# Patient Record
Sex: FEMALE | Race: WHITE | NOT HISPANIC OR LATINO | Employment: UNEMPLOYED | URBAN - METROPOLITAN AREA
[De-identification: names, ages, dates, MRNs, and addresses within clinical notes are randomized per-mention and may not be internally consistent; named-entity substitution may affect disease eponyms.]

---

## 2017-10-08 ENCOUNTER — APPOINTMENT (OUTPATIENT)
Dept: GENERAL RADIOLOGY | Facility: HOSPITAL | Age: 62
End: 2017-10-08

## 2017-10-08 ENCOUNTER — HOSPITAL ENCOUNTER (EMERGENCY)
Facility: HOSPITAL | Age: 62
Discharge: HOME | End: 2017-10-08
Attending: EMERGENCY MEDICINE | Admitting: EMERGENCY MEDICINE

## 2017-10-08 VITALS
TEMPERATURE: 98.4 F | HEART RATE: 62 BPM | RESPIRATION RATE: 14 BRPM | HEIGHT: 63 IN | WEIGHT: 140 LBS | DIASTOLIC BLOOD PRESSURE: 89 MMHG | OXYGEN SATURATION: 97 % | SYSTOLIC BLOOD PRESSURE: 165 MMHG | BODY MASS INDEX: 24.8 KG/M2

## 2017-10-08 DIAGNOSIS — R07.89 ATYPICAL CHEST PAIN: Primary | ICD-10-CM

## 2017-10-08 LAB
ALBUMIN SERPL-MCNC: 4.1 G/DL (ref 3.5–5.2)
ALBUMIN/GLOB SERPL: 1.2 G/DL
ALP SERPL-CCNC: 74 U/L (ref 39–117)
ALT SERPL W P-5'-P-CCNC: 10 U/L (ref 1–33)
ANION GAP SERPL CALCULATED.3IONS-SCNC: 9.5 MMOL/L
AST SERPL-CCNC: 16 U/L (ref 1–32)
BASOPHILS # BLD AUTO: 0.06 10*3/MM3 (ref 0–0.2)
BASOPHILS NFR BLD AUTO: 1.1 % (ref 0–1.5)
BILIRUB SERPL-MCNC: 0.4 MG/DL (ref 0.1–1.2)
BUN BLD-MCNC: 14 MG/DL (ref 8–23)
BUN/CREAT SERPL: 17.1 (ref 7–25)
CALCIUM SPEC-SCNC: 9.1 MG/DL (ref 8.6–10.5)
CHLORIDE SERPL-SCNC: 105 MMOL/L (ref 98–107)
CO2 SERPL-SCNC: 27.5 MMOL/L (ref 22–29)
CREAT BLD-MCNC: 0.82 MG/DL (ref 0.57–1)
DEPRECATED RDW RBC AUTO: 40.5 FL (ref 37–54)
EOSINOPHIL # BLD AUTO: 0.13 10*3/MM3 (ref 0–0.7)
EOSINOPHIL NFR BLD AUTO: 2.5 % (ref 0.3–6.2)
ERYTHROCYTE [DISTWIDTH] IN BLOOD BY AUTOMATED COUNT: 12.6 % (ref 11.7–13)
GFR SERPL CREATININE-BSD FRML MDRD: 71 ML/MIN/1.73
GLOBULIN UR ELPH-MCNC: 3.4 GM/DL
GLUCOSE BLD-MCNC: 102 MG/DL (ref 65–99)
HCT VFR BLD AUTO: 43.8 % (ref 35.6–45.5)
HGB BLD-MCNC: 14.3 G/DL (ref 11.9–15.5)
HOLD SPECIMEN: NORMAL
HOLD SPECIMEN: NORMAL
IMM GRANULOCYTES # BLD: 0 10*3/MM3 (ref 0–0.03)
IMM GRANULOCYTES NFR BLD: 0 % (ref 0–0.5)
LYMPHOCYTES # BLD AUTO: 1.41 10*3/MM3 (ref 0.9–4.8)
LYMPHOCYTES NFR BLD AUTO: 26.9 % (ref 19.6–45.3)
MCH RBC QN AUTO: 29.4 PG (ref 26.9–32)
MCHC RBC AUTO-ENTMCNC: 32.6 G/DL (ref 32.4–36.3)
MCV RBC AUTO: 89.9 FL (ref 80.5–98.2)
MONOCYTES # BLD AUTO: 0.33 10*3/MM3 (ref 0.2–1.2)
MONOCYTES NFR BLD AUTO: 6.3 % (ref 5–12)
NEUTROPHILS # BLD AUTO: 3.31 10*3/MM3 (ref 1.9–8.1)
NEUTROPHILS NFR BLD AUTO: 63.2 % (ref 42.7–76)
PLATELET # BLD AUTO: 272 10*3/MM3 (ref 140–500)
PMV BLD AUTO: 9.7 FL (ref 6–12)
POTASSIUM BLD-SCNC: 4.1 MMOL/L (ref 3.5–5.2)
PROT SERPL-MCNC: 7.5 G/DL (ref 6–8.5)
RBC # BLD AUTO: 4.87 10*6/MM3 (ref 3.9–5.2)
SODIUM BLD-SCNC: 142 MMOL/L (ref 136–145)
TROPONIN T SERPL-MCNC: <0.01 NG/ML (ref 0–0.03)
WBC NRBC COR # BLD: 5.24 10*3/MM3 (ref 4.5–10.7)
WHOLE BLOOD HOLD SPECIMEN: NORMAL
WHOLE BLOOD HOLD SPECIMEN: NORMAL

## 2017-10-08 PROCEDURE — 93010 ELECTROCARDIOGRAM REPORT: CPT | Performed by: INTERNAL MEDICINE

## 2017-10-08 PROCEDURE — 80053 COMPREHEN METABOLIC PANEL: CPT | Performed by: EMERGENCY MEDICINE

## 2017-10-08 PROCEDURE — 71020 HC CHEST PA AND LATERAL: CPT

## 2017-10-08 PROCEDURE — 99284 EMERGENCY DEPT VISIT MOD MDM: CPT

## 2017-10-08 PROCEDURE — 85025 COMPLETE CBC W/AUTO DIFF WBC: CPT | Performed by: EMERGENCY MEDICINE

## 2017-10-08 PROCEDURE — 84484 ASSAY OF TROPONIN QUANT: CPT | Performed by: EMERGENCY MEDICINE

## 2017-10-08 PROCEDURE — 93005 ELECTROCARDIOGRAM TRACING: CPT

## 2017-10-08 RX ORDER — SODIUM CHLORIDE 0.9 % (FLUSH) 0.9 %
10 SYRINGE (ML) INJECTION AS NEEDED
Status: DISCONTINUED | OUTPATIENT
Start: 2017-10-08 | End: 2017-10-08 | Stop reason: HOSPADM

## 2017-10-08 NOTE — DISCHARGE INSTRUCTIONS
Follow up with Cardiology this week-call Monday for appointment  Return to er for fever, chills, shortness of air, chest pain, or any new or worsening symptoms

## 2017-10-08 NOTE — ED PROVIDER NOTES
"  EMERGENCY DEPARTMENT ENCOUNTER    CHIEF COMPLAINT  Chief Complaint: CP  History given by:Pt  History limited by:Nothing  Room Number:   PMD: No Known Provider      HPI:  Pt is a 62 y.o. female who presents with intermittent L sided CP to \"5-6 intercostal space,\" onset two weeks ago. She states her CP radiates into her L arm. She states her episodes varying in duration from a few seconds up to a few minutes. Pt states she began noticing her intermittent episodes of CP became more frequent when she was off of work yesterday and at rest. She states her current CP is mild. Patient also complains of fatigue. Patient denies fever, chills, diaphoresis, and SOA. Pt states she has taken ibuprofen and ASA which have not alleviated her sx. Pt denies any HTN, diabetes, or previous cardiac work up or stress test.    Duration: Onset two weeks ago, varying duration of a few seconds to a few minutes   Timing:intermittent  Location: L\"5-6 intercostal space\"  Radiation:into L arm  Quality:CP  Intensity/Severity:moderate  Progression:worsening  Associated Symptoms:fatigue  Aggravating Factors:none  Alleviating Factors:none  Previous Episodes:None stated  Treatment before arrival:Pt states she has taken ASA and ibuprofen which did not alleviate her sx    PAST MEDICAL HISTORY  Active Ambulatory Problems     Diagnosis Date Noted   • No Active Ambulatory Problems     Resolved Ambulatory Problems     Diagnosis Date Noted   • No Resolved Ambulatory Problems     No Additional Past Medical History       PAST SURGICAL HISTORY  Past Surgical History:   Procedure Laterality Date   •  SECTION         FAMILY HISTORY  History reviewed. No pertinent family history.    SOCIAL HISTORY  Social History     Social History   • Marital status: Single     Spouse name: N/A   • Number of children: N/A   • Years of education: N/A     Occupational History   • Not on file.     Social History Main Topics   • Smoking status: Never Smoker   • " "Smokeless tobacco: Not on file   • Alcohol use No   • Drug use: No   • Sexual activity: Defer     Other Topics Concern   • Not on file     Social History Narrative   • No narrative on file         ALLERGIES  Review of patient's allergies indicates no known allergies.    REVIEW OF SYSTEMS  Review of Systems   Constitutional: Positive for fatigue. Negative for chills, diaphoresis and fever.   HENT: Negative for sore throat.    Respiratory: Negative for shortness of breath.    Cardiovascular: Positive for chest pain (L sided \"5-6 intercostal space\").   Gastrointestinal: Negative for nausea and vomiting.   Genitourinary: Negative for dysuria.   Musculoskeletal: Negative for back pain.   Skin: Negative for rash.   Neurological: Negative for dizziness.   Psychiatric/Behavioral: The patient is not nervous/anxious.        PHYSICAL EXAM  ED Triage Vitals   Temp Heart Rate Resp BP SpO2   10/08/17 0915 10/08/17 0914 10/08/17 0914 10/08/17 0921 10/08/17 0914   98.4 °F (36.9 °C) 95 16 172/77 100 %       Physical Exam   Constitutional: She is well-developed, well-nourished, and in no distress.   HENT:   Head: Normocephalic.   Mouth/Throat: Mucous membranes are normal.   Eyes: No scleral icterus.   Neck: Normal range of motion.   Cardiovascular: Normal rate, regular rhythm and normal heart sounds.    Pulmonary/Chest: Effort normal and breath sounds normal. She exhibits no tenderness.   Musculoskeletal: Normal range of motion.   Neurological: She is alert.   Skin: Skin is warm and dry.   Psychiatric: Mood and affect normal.   Nursing note and vitals reviewed.      LAB RESULTS  Recent Results (from the past 24 hour(s))   Comprehensive Metabolic Panel    Collection Time: 10/08/17  9:40 AM   Result Value Ref Range    Glucose 102 (H) 65 - 99 mg/dL    BUN 14 8 - 23 mg/dL    Creatinine 0.82 0.57 - 1.00 mg/dL    Sodium 142 136 - 145 mmol/L    Potassium 4.1 3.5 - 5.2 mmol/L    Chloride 105 98 - 107 mmol/L    CO2 27.5 22.0 - 29.0 mmol/L    " Calcium 9.1 8.6 - 10.5 mg/dL    Total Protein 7.5 6.0 - 8.5 g/dL    Albumin 4.10 3.50 - 5.20 g/dL    ALT (SGPT) 10 1 - 33 U/L    AST (SGOT) 16 1 - 32 U/L    Alkaline Phosphatase 74 39 - 117 U/L    Total Bilirubin 0.4 0.1 - 1.2 mg/dL    eGFR Non African Amer 71 >60 mL/min/1.73    Globulin 3.4 gm/dL    A/G Ratio 1.2 g/dL    BUN/Creatinine Ratio 17.1 7.0 - 25.0    Anion Gap 9.5 mmol/L   Troponin    Collection Time: 10/08/17  9:40 AM   Result Value Ref Range    Troponin T <0.010 0.000 - 0.030 ng/mL   Light Blue Top    Collection Time: 10/08/17  9:40 AM   Result Value Ref Range    Extra Tube hold for add-on    Green Top (Gel)    Collection Time: 10/08/17  9:40 AM   Result Value Ref Range    Extra Tube Hold for add-ons.    Lavender Top    Collection Time: 10/08/17  9:40 AM   Result Value Ref Range    Extra Tube hold for add-on    Gold Top - SST    Collection Time: 10/08/17  9:40 AM   Result Value Ref Range    Extra Tube Hold for add-ons.    CBC Auto Differential    Collection Time: 10/08/17  9:40 AM   Result Value Ref Range    WBC 5.24 4.50 - 10.70 10*3/mm3    RBC 4.87 3.90 - 5.20 10*6/mm3    Hemoglobin 14.3 11.9 - 15.5 g/dL    Hematocrit 43.8 35.6 - 45.5 %    MCV 89.9 80.5 - 98.2 fL    MCH 29.4 26.9 - 32.0 pg    MCHC 32.6 32.4 - 36.3 g/dL    RDW 12.6 11.7 - 13.0 %    RDW-SD 40.5 37.0 - 54.0 fl    MPV 9.7 6.0 - 12.0 fL    Platelets 272 140 - 500 10*3/mm3    Neutrophil % 63.2 42.7 - 76.0 %    Lymphocyte % 26.9 19.6 - 45.3 %    Monocyte % 6.3 5.0 - 12.0 %    Eosinophil % 2.5 0.3 - 6.2 %    Basophil % 1.1 0.0 - 1.5 %    Immature Grans % 0.0 0.0 - 0.5 %    Neutrophils, Absolute 3.31 1.90 - 8.10 10*3/mm3    Lymphocytes, Absolute 1.41 0.90 - 4.80 10*3/mm3    Monocytes, Absolute 0.33 0.20 - 1.20 10*3/mm3    Eosinophils, Absolute 0.13 0.00 - 0.70 10*3/mm3    Basophils, Absolute 0.06 0.00 - 0.20 10*3/mm3    Immature Grans, Absolute 0.00 0.00 - 0.03 10*3/mm3       I ordered the above labs and reviewed the results    RADIOLOGY  XR  Chest 2 View   Preliminary Result   No acute process.              I ordered the above noted radiological studies and reviewed the images on the PACS system.    HEARTSCORE    History  Slightly or non-suspicious             0    ECG  Normal                           0    Age  46-65                           1    Risk factors (hypercholesterolemia, HTN, DM, smoking, pos fam hx, obesity)      No risk factors                0    Troponin < or = Normal limit    0    Score  0 - 3 is low risk    This patient's HEART score is 1     We discussed the shared decision pathway regarding the patient's heart score and choice for being discharged home versus admitted to the hospital for further evaluation. Patient is in agreement to be discharged at this time for follow-up with her family physician and/ or cardiologist.        EKG    ekg was interpreted by Dr. Alberts.    PROGRESS AND CONSULTS    1055 - Reviewed pt's history and workup with Dr. Alberts.  At bedside evaluation, they agree with the plan of care.    1111 - Per Dr. Alberts, pt is declining second troponin and agrees with plan to discharge home with a follow up with cardiology in three days for further evaluation.       Reviewed implications of results, diagnosis, meds, responsibility to follow up, warning signs and symptoms of possible worsening, potential complications and reasons to return to ER with patient.  Discussed all results and noted any abnormalities with patient.  Discussed absolute need to recheck abnormalities with cardiologist.    Discussed plan for discharge, as there is no emergent indication for admission.  Pt is agreeable and understands need for follow up and repeat testing.  Pt is aware that discharge does not mean that nothing is wrong but it indicates no emergency is present.  Pt is discharged with instructions to follow up with primary care doctor to have their blood pressure rechecked.       DIAGNOSIS  Final diagnoses:   Atypical chest pain       FOLLOW  "UP   Lc Godinez MD  [id number]  [address] [address]  [phone]    Call in 1 day        COURSE & MEDICAL DECISION MAKING  Pertinent Labs and Imaging studies that were ordered and reviewed are noted above.  Results were reviewed/discussed with the patient and they were also made aware of online assess.   Pt also made aware that some labs, such as cultures, will not be resulted during ER visit and follow up with PMD is necessary.     MEDICATIONS GIVEN IN ER  Medications   sodium chloride 0.9 % flush 10 mL (not administered)       /80  Pulse 70  Temp 98.4 °F (36.9 °C)  Resp 14  Ht 63\" (160 cm)  Wt 140 lb (63.5 kg)  SpO2 99%  BMI 24.8 kg/m2      I personally reviewed the past medical history, past surgical history, social history, family history, current medications and allergies as they appear in this chart.  The scribe's note accurately reflects the work and decisions made by me.     Documentation assistance provided by srini Wallis for ROB Zavaleta on 10/8/2017 at 11:17 AM. Information recorded by the scribe was done at my direction and has been verified and validated by me.         Lakhwinder Wallis  10/08/17 1445       TANIKA Spencer  10/08/17 4147    "

## 2017-10-08 NOTE — ED TRIAGE NOTES
"Chief Complaint   Patient presents with   • Chest Pain     Intermittent chest pain for two weeks. No pain at this time. Pt states \"It feels like my diaphragm is pushing up\"        "

## 2017-10-08 NOTE — ED PROVIDER NOTES
"The patient presents complaining of intermittent, L lower/anterior chest discomfort for the past 2 weeks that is worse with laying on her L side. Pt states within the past week she has been feeling \"prickling\" in her L arm which is not coincidental with her chest pain. Pt describes the chest discomfort as a \"dull ache\" and the episodes last anywhere from a few seconds to several minutes. Pt denies recent cough, N/V, SOA, fever, abd pain, swelling of arms/legs.  PT denies FHX CAD, personal hx HTN, elev chol/diabetes.    Limited physical exam:  Patient is nontoxic appearing   Lungs/cardiovascular: lungs CTAB, Heart RRR  Abdomen: soft, non tender  Back/extremities: normal    EKG         EKG time: 09:22  Rhythm/Rate: NSR, Rate 70's  P waves and DE: LAD  QRS, axis: normal   ST and T waves: normal     Interpreted Contemporaneously by me, independently viewed  No previous for comparisoon    Discussed Heart Score:1 that indicates low risk of acute cardiac process. Discussed shared decision making tool and gave Pt the option discharge without repeating troponin, to repeat troponin, making risk 1% of acute cardiac event in the next 30 days with negative result, or option to admit for overnight observation. Pt would like to be discharged without repeat troponin and agrees to follow up with cardiologist within the next 3 days. Pt verbalizes understanding of risks, including heart attack. All questions and concerns addressed at this time. RTER warnings given, including increased severity, duration, or association with exertion.       I supervised care provided by the midlevel provider TANIKA Zavaleta.  We have discussed this patient's history, physical exam, and treatment plan.  I have reviewed the note and personally saw and examined the patient and agree with the plan of care.    Documentation assistance provided by srini Khan for Dr. Alberts.  Information recorded by the srini was done at my direction " and has been verified and validated by me.           Ines Khan  10/08/17 1111       Benita Alberts MD  10/08/17 4230

## 2017-10-17 ENCOUNTER — OFFICE VISIT (OUTPATIENT)
Dept: CARDIOLOGY | Facility: CLINIC | Age: 62
End: 2017-10-17

## 2017-10-17 VITALS
DIASTOLIC BLOOD PRESSURE: 82 MMHG | SYSTOLIC BLOOD PRESSURE: 140 MMHG | HEART RATE: 76 BPM | HEIGHT: 63 IN | OXYGEN SATURATION: 98 % | WEIGHT: 136 LBS | BODY MASS INDEX: 24.1 KG/M2

## 2017-10-17 DIAGNOSIS — R07.9 CHEST PAIN, UNSPECIFIED TYPE: Primary | ICD-10-CM

## 2017-10-17 DIAGNOSIS — R06.01 ORTHOPNEA: ICD-10-CM

## 2017-10-17 PROCEDURE — 99203 OFFICE O/P NEW LOW 30 MIN: CPT | Performed by: PHYSICIAN ASSISTANT

## 2017-10-17 PROCEDURE — 93000 ELECTROCARDIOGRAM COMPLETE: CPT | Performed by: PHYSICIAN ASSISTANT

## 2017-10-17 NOTE — PROGRESS NOTES
Date of Office Visit: 10/17/2017  Encounter Provider: SEVERO Rojo  Place of Service: [facility] CARDIOLOGY  Patient Name: Johanny Robins  :1955    Chief Complaint   Patient presents with   • Chest Pain     1 week hospital follow up   :     HPI: Johanny Robins is a 62 y.o. female, new to me, who presents today for follow-up after emergency room visit.  Old records have been obtained and reviewed by me.  She is a patient with no prior medical history who presented to the emergency room on 10/8/2017 with complaints of intermittent left-sided chest pain that is been going on for about 2 weeks.  She told the ER physician that it radiated to her left arm.  It was with rest and not exertion.  A CBC, CMP, and troponin were all within normal limits, a chest x-ray was unremarkable.  According to the ER physician's note, the patient declined a second set of troponin and was discharged home.  She is here today for evaluation of her chest pain.   Over the past 2 weeks, she has had this intermittent pain underneath her left breast.  She describes it as a dull ache.  It lasts anywhere from 30 seconds to a few minutes and then goes away on its own.  It is random in its timing.  It happens throughout the day, it happens with exercise, sometimes it doesn't happen with exertion.  Sometimes it happens at rest, sometimes it does not.  It has been increasing in frequency over the past 2 weeks.  It does wake her up at night.  She also has difficulty laying flat because she feels short of breath.  She is now sleeping on 4 pillows to be able to sleep.  She denies any dyspnea on exertion.  Sometimes the pain radiates to her left arm with a weird sensation that she has difficulty describing.  She is very frustrated today because she had to pay out of pocket for this visit.  Evidently her insurance is very expensive, and she has having a hard time affording all of her care.   She never  smoked, she does not drink.  She is unsure of her lipid status or hemoglobin A1c because she never goes to the doctor secondary to insurance issues and feeling like she has never been sick.  She is a nurse.  She has a father who had a heart attack at age 60, her mother does not have any coronary disease.  She has siblings, none of whom have issues with heart disease.      Past Medical History:   Diagnosis Date   • Chest pain        Past Surgical History:   Procedure Laterality Date   •  SECTION         Social History     Social History   • Marital status: Single     Spouse name: N/A   • Number of children: N/A   • Years of education: N/A     Occupational History   • Not on file.     Social History Main Topics   • Smoking status: Never Smoker   • Smokeless tobacco: Never Used   • Alcohol use No   • Drug use: No   • Sexual activity: Defer     Other Topics Concern   • Not on file     Social History Narrative       Family History   Problem Relation Age of Onset   • Hypothyroidism Mother    • Heart attack Father    • Lung disease Father    • No Known Problems Sister    • Cerebral palsy Brother    • No Known Problems Maternal Grandmother    • No Known Problems Maternal Grandfather    • No Known Problems Paternal Grandmother    • No Known Problems Paternal Grandfather        Review of Systems   Constitution: Negative for chills, fever, malaise/fatigue, weight gain and weight loss.   HENT: Negative for ear pain, hearing loss, nosebleeds and sore throat.    Eyes: Negative for double vision, pain and visual disturbance.   Cardiovascular: Positive for chest pain and orthopnea. Negative for dyspnea on exertion, irregular heartbeat, leg swelling, near-syncope, palpitations, paroxysmal nocturnal dyspnea and syncope.   Respiratory: Negative for cough, shortness of breath, sleep disturbances due to breathing, snoring and wheezing.    Endocrine: Negative for cold intolerance, heat intolerance and polyuria.   Skin: Negative  "for itching and rash.   Musculoskeletal: Negative for joint pain, joint swelling and myalgias.   Gastrointestinal: Negative for abdominal pain, diarrhea, melena, nausea and vomiting.   Genitourinary: Negative for frequency, hematuria and hesitancy.   Neurological: Negative for excessive daytime sleepiness, headaches, light-headedness, numbness, paresthesias and seizures.   Psychiatric/Behavioral: Negative for altered mental status and depression.   Allergic/Immunologic: Negative.    All other systems reviewed and are negative.      No Known Allergies    No current outpatient prescriptions on file.     Objective:     Vitals:    10/17/17 0858 10/17/17 0916   BP: 128/78 140/82   BP Location: Right arm Left arm   Pulse: 76    SpO2: 98%    Weight: 136 lb (61.7 kg)    Height: 63\" (160 cm)      Body mass index is 24.09 kg/(m^2).    PHYSICAL EXAM:    Physical Exam   Constitutional: She is oriented to person, place, and time. She appears well-developed and well-nourished. No distress.   HENT:   Head: Normocephalic and atraumatic.   Eyes: Pupils are equal, round, and reactive to light.   Neck: No JVD present. No thyromegaly present.   Cardiovascular: Normal rate, regular rhythm, normal heart sounds and intact distal pulses.    No murmur heard.  Pulmonary/Chest: Effort normal and breath sounds normal. No respiratory distress.   Abdominal: Soft. Bowel sounds are normal. She exhibits no distension. There is no splenomegaly or hepatomegaly. There is no tenderness.   Musculoskeletal: Normal range of motion. She exhibits no edema.   Neurological: She is alert and oriented to person, place, and time.   Skin: Skin is warm and dry. She is not diaphoretic. No erythema.   Psychiatric: She has a normal mood and affect. Her behavior is normal. Judgment normal.         ECG 12 Lead  Date/Time: 10/17/2017 9:20 AM  Performed by: ZEINBA SANTORO.  Authorized by: ZEINAB SANTORO.   Comparison: compared with previous ECG from 10/8/2017  Similar " to previous ECG  Rhythm: sinus rhythm  BPM: 76  Clinical impression: normal ECG  Comments: Indication: Chest pain              Assessment:       Diagnosis Plan   1. Chest pain, unspecified type  ECG 12 Lead    Adult Transthoracic Echo Complete W/ Cont if Necessary Per Protocol    Treadmill Stress Test   2. Orthopnea  Adult Transthoracic Echo Complete W/ Cont if Necessary Per Protocol    Treadmill Stress Test     Orders Placed This Encounter   Procedures   • Treadmill Stress Test     Standing Status:   Future     Standing Expiration Date:   10/17/2018     Order Specific Question:   Reason for exam?     Answer:   Chest Pain     Order Specific Question:   Chest pain specification?     Answer:   Atypical   • ECG 12 Lead     This order was created via procedure documentation   • Adult Transthoracic Echo Complete W/ Cont if Necessary Per Protocol     Standing Status:   Future     Order Specific Question:   Reason for exam?     Answer:   Dyspnea     Order Specific Question:   Reason for exam?     Answer:   Chest Pain          Plan:       This is a patient with very few risk factors who is having atypical chest pain.  I'm not sure what her lipid status is, and she does have a family history of a heart attack in her dad around her age, 60 years old.  Her ECG today is normal.  Her physical exam is completely normal.  Because of her chest pain and orthopnea, I recommending an echocardiogram and a treadmill stress test.  However, because of her insurance problems and the inability to afford care at this time, she would like to defer this testing to a later date.  I do think that this is okay, I do think that she is stable.  However, I did discuss with her that if the pain gets worse she needs to give me a call or go to the emergency room.  In the meantime, I have suggested that she start an over-the-counter PPI to see if that helps with her symptoms.  Certainly her pain could be GI in etiology.      As always, it has been a  pleasure to participate in your patient's care.      Sincerely,         Aneta Curry PA-C

## 2019-02-20 ENCOUNTER — OFFICE VISIT CONVERTED (OUTPATIENT)
Dept: FAMILY MEDICINE CLINIC | Age: 64
End: 2019-02-20
Attending: FAMILY MEDICINE

## 2019-07-01 ENCOUNTER — OFFICE VISIT CONVERTED (OUTPATIENT)
Dept: FAMILY MEDICINE CLINIC | Age: 64
End: 2019-07-01
Attending: NURSE PRACTITIONER

## 2019-07-27 ENCOUNTER — OFFICE VISIT CONVERTED (OUTPATIENT)
Dept: FAMILY MEDICINE CLINIC | Age: 64
End: 2019-07-27
Attending: NURSE PRACTITIONER

## 2019-08-23 ENCOUNTER — OFFICE VISIT CONVERTED (OUTPATIENT)
Dept: FAMILY MEDICINE CLINIC | Age: 64
End: 2019-08-23
Attending: FAMILY MEDICINE

## 2021-01-23 ENCOUNTER — OFFICE VISIT CONVERTED (OUTPATIENT)
Dept: FAMILY MEDICINE CLINIC | Age: 66
End: 2021-01-23
Attending: NURSE PRACTITIONER

## 2021-05-18 NOTE — PROGRESS NOTES
Johanny Robins 1955     Office/Outpatient Visit    Visit Date: Fri, Aug 23, 2019 01:01 pm    Provider: Hayley Doherty MD (Assistant: Selene Gonzalez MA)    Location: [facility]        Electronically signed by Hayley Doherty MD on  2019 03:50:51 PM                             SUBJECTIVE:        CC:     Ms. Robins is a 63 year old White female.  Patient presents today for physical exam (not using cream);         HPI:     Johanny is here today for annual physical for pre-employment.  She is reportedly UTD colonoscopy, last done 9 years ago up in [facility].  She is due for pap smear.  She is due for mammogram; she would like to defer that for now.  She is reportedly UTD on Td.  She is due for Shingrix, Havrix.  Flu shot not currently indicated.     Health checkup noted.          PHQ-9 Depression Screening: Completed form scanned and in chart; Total Score 2 Alcohol Consumption Screening: Completed form scanned and in chart; Total Score 0     ROS:     CONSTITUTIONAL:  Negative for fatigue and fever.      EYES:  Negative for blurred vision.      E/N/T:  Negative for diminished hearing and nasal congestion.      CARDIOVASCULAR:  Negative for chest pain and palpitations.      RESPIRATORY:  Negative for recent cough and dyspnea.      GASTROINTESTINAL:  Negative for abdominal pain, constipation, diarrhea, nausea and vomiting.      GENITOURINARY:  Negative for dysuria and urinary incontinence.      MUSCULOSKELETAL:  Negative for arthralgias and myalgias.      INTEGUMENTARY/BREAST:  Negative for atypical mole(s) and rash.      NEUROLOGICAL:  Negative for paresthesias and weakness.      PSYCHIATRIC:  Negative for anxiety, depression and sleep disturbance.          PMH/FMH/SH:     Last Reviewed on 2019 01:17 PM by Hayley Doherty    Past Medical History:             PAST MEDICAL HISTORY     Hospitalizations: MRSA/facial cellulitis, admitted once on          GYNECOLOGICAL HISTORY:      miscarriage 3         Surgical History:         : X 3;     Procedures:    Colonoscopy ( 2015-NEG;; )         Family History:     Father: Healthy     Mother: Hypothyroidism     Brother(s): 1 brother(s) total;  cerebral palsy     Sister(s): Healthy; 1 sister(s) total     Son(s): Healthy; 1 son(s) total     Daughter(s): 2 daughter(s) total;  ASD;  Asthma     Paternal Grandfather: Cause of death was TB/pneumonia     Paternal Grandmother:  at age 50's; Cause of death was pneumonia;  heart issues     Maternal Grandfather:  at age 86;     Maternal Grandmother: Arrhythmia;  Dementia         Social History:     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  Non-drinker     Caffeine:  She admits to consuming caffeine via coffee ( 1 serving per day ).          Substance Abuse History:     Last Reviewed on 2019 01:17 PM by Hayley Doherty    NEGATIVE         Mental Health History:     Last Reviewed on 2019 01:17 PM by Hayley Doherty        Communicable Diseases (eg STDs):     Last Reviewed on 2019 01:17 PM by Hayley Doherty            Current Problems:     Last Reviewed on 2019 11:45 AM by Anatoliy Cotter    Memory loss     Benign positional vertigo (mild, intermittent)     Contact dermatitis     Cerumen impaction     Screening for depression         Immunizations:     None        Allergies:     Last Reviewed on 2019 11:45 AM by Anatoliy Cotter      No Known Drug Allergies.         Current Medications:     Last Reviewed on 2019 11:45 AM by Anatoliy Cotter    Triamcinolone Acetonide 0.1% Cream Apply thin film to affected area bid , prn         OBJECTIVE:        Vitals:         Current: 2019 1:05:51 PM    Ht:  5 ft, 3.5 in;  Wt: 133.4 lbs;  BMI: 23.3    T: 98.4 F (oral);  BP: 119/61 mm  Hg (left arm, sitting);  P: 75 bpm (left arm (BP Cuff), sitting);  sCr: 0.86 mg/dL;  GFR: 64.58        Exams:     PHYSICAL EXAM:     GENERAL: vital signs recorded - well developed, well nourished;  well groomed;  no apparent distress;     EYES: extraocular movements intact; conjunctiva and cornea are normal; PERRLA;     E/N/T: OROPHARYNX:  normal mucosa, dentition, gingiva, and posterior pharynx;     RESPIRATORY: normal respiratory rate and pattern with no distress; normal breath sounds with no rales, rhonchi, wheezes or rubs;     CARDIOVASCULAR: normal rate; rhythm is regular;  no systolic murmur; no edema;     GASTROINTESTINAL: nontender; normal bowel sounds;     LYMPHATIC: no enlargement of cervical or facial nodes;     MUSCULOSKELETAL: normal gait; normal overall tone     NEUROLOGIC: mental status: alert and oriented x 3; Reflexes: brachioradialis: 2+; knee jerks: 2+;     PSYCHIATRIC: appropriate affect and demeanor; normal psychomotor function; normal speech pattern;         Procedures:     Health checkup     1. PPD placement: 0.1 ml unit dose given intradermally left forearm; administered by AS @ 1339;  lot number [id number]; expires 05/22/2021             ASSESSMENT           V70.0   Z00.00  Health checkup              DDx:     V79.0   Z13.31  Screening for depression              DDx:         ORDERS:         Lab Orders:       [id number]  PPD TB test  (In-House)           Other Orders:         Depression screen negative  (In-House)                   PLAN:          Health checkup Johanny is here today for annual physical for pre-employment.  She is reportedly UTD colonoscopy, last done 9 years ago up in [facility].  She is due for pap smear.  She is due for mammogram; she would like to defer that for now.  She is reportedly UTD on Td.  She is due for Shingrix, Havrix.  Flu shot not currently indicated.  She needs a TBST placed today.  She will come back on Monday to have that read.  Form completed for her employment  physical and returned to her today.         IMMUNIZATIONS given today: PPD placed..  MIPS PHQ-9 Depression Screening: Completed form scanned and in chart; Total Score 2; Negative Depression Screen           Orders:       [id number]  PPD TB test  (In-House)           Depression screen negative  (In-House)               CHARGE CAPTURE           **Please note: ICD descriptions below are intended for billing purposes only and may not represent clinical diagnoses**        Primary Diagnosis:         V70.0 Health checkup            Z00.00    Encounter for general adult medical examination without abnormal findings              Orders:          [id number]   Preventive medicine, established patient, age 40-64 years  (In-House)             19521   PPD TB test  (In-House)                Depression screen negative  (In-House)           V79.0 Screening for depression            Z13.31    Encounter for screening for depression        ADDENDUMS:      ____________________________________    Addendum: 08/26/2019 08:50 AM - Veena Caceres        PPD read on 8/26/19 @ 8:48am, 0mm induration on LFA./cm

## 2021-05-18 NOTE — PROGRESS NOTES
Johanny Robins 1955     Office/Outpatient Visit    Visit Date: Sat 11:23 am    Provider: Anatoliy Cotter N.P. (Assistant: Christina Ponce MA)    Location: [facility]        Electronically signed by Anatoliy Cotter N.P. on  2019 12:10:34 PM                             SUBJECTIVE:        CC:     Ms. Robins is a 63 year old White female.  Rash on both arms x4 days;         HPI:         Rash noted.  This has been a problem for the past one to two days.  The rash has not occurred previously.  It is located primarily on the chest and on the both hands.  She describes the rash as red, papular, and distributed in irregularly shaped patches.  The rash is pruritic.  She denies any associated symptoms.  Possible contributing factors include outside working in the yard and exposed to sun.      ROS:     CONSTITUTIONAL:  Negative for fatigue and fever.      CARDIOVASCULAR:  Negative for chest pain, palpitations, tachycardia, orthopnea, and edema.      RESPIRATORY:  Negative for recent cough, dyspnea and frequent wheezing.      GASTROINTESTINAL:  Negative for abdominal pain, constipation, diarrhea, nausea and vomiting.      INTEGUMENTARY/BREAST:  Positive for rash.   Negative for atypical mole(s).      PSYCHIATRIC:  Negative for anxiety, depression, and sleep disturbances.          PM/[facility]/:     Last Reviewed on 2019 11:45 AM by Anatoliy Cotter    Past Medical History:             PAST MEDICAL HISTORY     Hospitalizations: MRSA/facial cellulitis, admitted once on          GYNECOLOGICAL HISTORY:     miscarriage 3         Surgical History:         : X 3;     Procedures:    Colonoscopy ( 2015-NEG;; )         Family History:     Father: Healthy     Mother: Hypothyroidism     Brother(s): 1 brother(s) total;  cerebral palsy     Sister(s): Healthy; 1 sister(s) total     Son(s): Healthy; 1 son(s) total     Daughter(s): 2 daughter(s) total;  ASD;  Asthma      Paternal Grandfather: Cause of death was TB/pneumonia     Paternal Grandmother:  at age 50's; Cause of death was pneumonia;  heart issues     Maternal Grandfather:  at age 86;     Maternal Grandmother: Arrhythmia;  Dementia         Social History:     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  Non-drinker     Caffeine:  She admits to consuming caffeine via coffee ( 1 serving per day ).              Current Problems:     Last Reviewed on 2019 11:45 AM by Anatoliy Cotter    Memory loss     Benign positional vertigo (mild, intermittent)     Rash     Cerumen impaction     Screening for depression         Immunizations:     None        Allergies:     Last Reviewed on 2019 11:45 AM by Anatoliy Cotter      No Known Drug Allergies.         Current Medications:     Last Reviewed on 2019 11:45 AM by Anatoliy Cotter    None        OBJECTIVE:        Vitals:         Current: 2019 11:29:18 AM    Ht:  5 ft, 3.5 in;  Wt: 133.2 lbs;  BMI: 23.2    T: 97.8 F (oral);  BP: 130/62 mm Hg (left arm, sitting);  P: 71 bpm (left arm (BP Cuff), sitting);  sCr: 0.86 mg/dL;  GFR: 64.54        Exams:     PHYSICAL EXAM:     GENERAL: vital signs recorded - well developed, well nourished;  well groomed;  no apparent distress;     RESPIRATORY: normal respiratory rate and pattern with no distress; normal breath sounds with no rales, rhonchi, wheezes or rubs;     CARDIOVASCULAR: normal rate; rhythm is regular;  no systolic murmur; no edema;     GASTROINTESTINAL: nontender, nondistended; no hepatosplenomegaly or masses; no bruits;     BREAST/INTEGUMENT: papular scattered erythemic puritic papules both arms and chest;     NEUROLOGIC: GROSSLY INTACT     PSYCHIATRIC:  appropriate affect and demeanor; normal speech pattern;  grossly normal memory;         Procedures:     Contact dermatitis     1. Kenalog 40 mg given IM in the left hip; administered by PB;  lot number [id number]; expires 03/2021; Patient tolerated well             ASSESSMENT           692.9   L30.9  Contact dermatitis              DDx:         ORDERS:         Meds Prescribed:       Triamcinolone Acetonide 0.1% Cream Apply thin film to affected area bid , prn  #60 (Sixty) gm Refills: 1         Other Orders:       [id number]  Therapeutic injection  (In-House)           Kenalog, per 10 mg (x4)                 PLAN:          Contact dermatitis     Steroids Kenalog     FOLLOW-UP: Schedule follow-up appointments on a p.r.n. basis..            Prescriptions:       Triamcinolone Acetonide 0.1% Cream Apply thin film to affected area bid , prn  #60 (Sixty) gm Refills: 1           Orders:       [id number]  Therapeutic injection  (In-House)                     Kenalog, per 10 mg (x4)             Patient Recommendations:        For  Contact dermatitis:     Schedule follow-up appointments as needed.                APPOINTMENT INFORMATION:        Monday Tuesday Wednesday Thursday Friday Saturday Sunday            Time:___________________AM  PM   Date:_____________________             CHARGE CAPTURE           **Please note: ICD descriptions below are intended for billing purposes only and may not represent clinical diagnoses**        Primary Diagnosis:         692.9 Contact dermatitis            L30.9    Dermatitis, unspecified              Orders:          [id number]   Office/outpatient visit; established patient, level 3  (In-House)             [id number]   Therapeutic injection  (In-House)                                           Kenalog, per 10 mg (x4)

## 2021-05-18 NOTE — PROGRESS NOTES
Johanny Robins 1955     Office/Outpatient Visit    Visit Date: Wed, Feb 20, 2019 10:00 am    Provider: Robert Radford MD (Assistant: Lakshmi Koch MA)    Location: [facility]        Electronically signed by Robert Radford MD on  02/20/2019 01:08:25 PM                             SUBJECTIVE:        CC:     Ms. Robins is a 63 year old White female.  presents today due to reoccuring headaches         HPI:     About 4-5 years ago pt suffered a global transient ischemic attack, origin unknown. She was home, feeling normal, went out for coffee and walk the dog and her daughter called her and discovered her to have AMS. Pt went to [facility] and a neurologist from [place] came to see her and Dx her with transient global ischemia. She had a bad headache during that admission. She never had headaches previously but has headaches infrequently since then. Over the last 6 months she's had headaches about daily, she feels like she's in a fog. Headaches in various placed, behind her eyes, back of head, in temple. Pain can be sharp or dull and throbbing. Even when headaches aren't present, she feels foggy. She did not f/u with neurology after hospitalization.     - She later clarifies it was global transient amnesia.     She is having memory issues. She has trouble with recall, word finding. She has trouble remembering names. No blurry or altered vision. No numbness or paresthesia. No issues with weakness.     ROS:     CONSTITUTIONAL:  Negative for fatigue and fever.      EYES:  Negative for blurred vision.      E/N/T:  Negative for diminished hearing and nasal congestion.      CARDIOVASCULAR:  Negative for chest pain and palpitations.      RESPIRATORY:  Negative for recent cough and dyspnea.      GASTROINTESTINAL:  Negative for abdominal pain, constipation, diarrhea, nausea and vomiting.      MUSCULOSKELETAL:  Negative for arthralgias and myalgias.      NEUROLOGICAL:  Positive for dizziness, headaches and  memory loss.   Negative for ataxia, paresthesias or weakness.      PSYCHIATRIC:  Negative for anxiety, depression and sleep disturbance.          PMH/FMH/SH:     Last Reviewed on 2019 01:04 PM by Robert Radford    Past Medical History:             PAST MEDICAL HISTORY     Hospitalizations: MRSA/facial cellulitis, admitted once on          GYNECOLOGICAL HISTORY:     miscarriage 3         Surgical History:         : X 3;     Procedures:    Colonoscopy ( -NEG;; )         Family History:     Father: Healthy     Mother: Hypothyroidism     Brother(s): 1 brother(s) total;  cerebral palsy     Sister(s): Healthy; 1 sister(s) total     Son(s): Healthy; 1 son(s) total     Daughter(s): 2 daughter(s) total;  ASD;  Asthma     Paternal Grandfather: Cause of death was TB/pneumonia     Paternal Grandmother:  at age 50's; Cause of death was pneumonia;  heart issues     Maternal Grandfather:  at age 86;     Maternal Grandmother: Arrhythmia;  Dementia         Social History:     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  Non-drinker     Caffeine:  She admits to consuming caffeine via coffee ( 1 serving per day ).          Substance Abuse History:     Last Reviewed on 2019 01:04 PM by Robert Radford    NEGATIVE         Mental Health History:     Last Reviewed on 2019 01:04 PM by Robert Radford        Communicable Diseases (eg STDs):     Last Reviewed on 2019 01:04 PM by Robert Radford            Current Problems:     Last Reviewed on 2019 01:04 PM by Robert Radford    Memory loss     Benign positional vertigo (mild, intermittent)     Headache         Immunizations:     None        Allergies:     Last Reviewed on 2019 01:04 PM by Robert Radford      No Known Drug Allergies.         Current  Medications:     Last Reviewed on 2/20/2019 01:04 PM by Robert Radford        OBJECTIVE:        Vitals:         Current: 2/20/2019 10:05:07 AM    Ht:  5 ft, 3.5 in;  Wt: 140.2 lbs;  BMI: 24.4    T: 98.3 F (oral);  BP: 129/64 mm Hg (left arm, sitting);  P: 75 bpm (left arm (BP Cuff), sitting);  sCr: 0.86 mg/dL;  GFR: 65.96        Exams:     PHYSICAL EXAM:     GENERAL: vital signs recorded - well developed, well nourished;  well groomed;  no apparent distress;     EYES: extraocular movements intact; conjunctiva and cornea are normal; PERRLA;     E/N/T: OROPHARYNX:  normal mucosa, dentition, gingiva, and posterior pharynx;     RESPIRATORY: normal respiratory rate and pattern with no distress; normal breath sounds with no rales, rhonchi, wheezes or rubs;     CARDIOVASCULAR: normal rate; rhythm is regular;  no systolic murmur; no edema;     GASTROINTESTINAL: nontender; normal bowel sounds;     MUSCULOSKELETAL: normal gait; muscle strength: 5/5 in all major muscle groups;  normal overall tone     NEUROLOGIC: mental status: alert and oriented x 3; cranial nerves II-XII grossly intact; Reflexes: knee jerks: 2+;  GROSSLY INTACT     PSYCHIATRIC:  appropriate affect and demeanor; normal speech pattern; grossly normal memory;         ASSESSMENT           784.0   R51  Headache              DDx:     780.93   R41.9  Memory loss              DDx:         ORDERS:         Meds Prescribed:       Fioricet (Butalbital/Acetaminophen/Caffeine) 50mg/300mg/40mg Capsules 1 tab daily prn headache  #30 (Thirty) capsule(s) Refills: 0         Radiology/Test Orders:       [id number]  MRI, brain; without contrast  (Send-Out)           Procedures Ordered:       REFER  Referral to Specialist or Other Facility  (Send-Out)                   PLAN:          Headache Headaches worsening in setting of global transient amnesia is worrisome for neurological origin. She is referred to neurology and prescribed fioricet. In setting of memory issues will  also get MRI Brain.         REFERRALS:  Referral initiated to a neurologist ( Dr. Adama Hernandez [facility] ).            Prescriptions:       Fioricet (Butalbital/Acetaminophen/Caffeine) 50mg/300mg/40mg Capsules 1 tab daily prn headache  #30 (Thirty) capsule(s) Refills: 0           Orders:       REFER  Referral to Specialist or Other Facility  (Send-Out)            Memory loss Consider MOCA at next visit.         RADIOLOGY:  I have ordered a head MRI without contrast to be done today.            Orders:       [id number]  MRI, brain; without contrast  (Send-Out)               CHARGE CAPTURE           **Please note: ICD descriptions below are intended for billing purposes only and may not represent clinical diagnoses**        Primary Diagnosis:         784.0 Headache            R51    Headache              Orders:          [id number]   Office/outpatient visit; established patient, level 4  (In-House)           780.93 Memory loss            R41.9    Unspecified symptoms and signs involving cognitive functions and awareness

## 2021-05-18 NOTE — PROGRESS NOTES
Johanny Robins  1955     Office/Outpatient Visit    Visit Date: Sat 09:54 am    Provider: Norma Segundo N.P. (Assistant: Bella Joshi LPN)    Location: [facility]        Electronically signed by Norma Segundo N.P. on  2021 06:08:13 PM                             Subjective:        CC: fever blister coming up in mouth, new grandbaby in NICU, needs Tdap    HPI: Johanny is here today with c/o fever blister popping up. Noticed symptom of burning and tingling this past week. Admits that she has been under some stress recently with her grandchild being born. He is in NICU and diagnosed with roseola virus. Wants to update her Tdap vaccine.    ROS:     CONSTITUTIONAL:  Negative for chills and fever.      CARDIOVASCULAR:  Negative for chest pain and palpitations.      RESPIRATORY:  Negative for dyspnea and frequent wheezing.      INTEGUMENTARY/BREAST:  Positive for fever blister.      NEUROLOGICAL:  Negative for dizziness and headaches.      PSYCHIATRIC:  Negative for depression and suicidal thoughts.          Past Medical History / Family History / Social History:         Last Reviewed on 2019 01:17 PM by Hayley Doherty    Past Medical History:             PAST MEDICAL HISTORY     Hospitalizations: MRSA/facial cellulitis, admitted once on          GYNECOLOGICAL HISTORY:     miscarriage 3         Surgical History:         : X 3;     Procedures:    Colonoscopy ( 2015-NEG;; )         Family History:     Father: Healthy     Mother: Hypothyroidism     Brother(s): 1 brother(s) total;  cerebral palsy     Sister(s): Healthy; 1 sister(s) total     Son(s): Healthy; 1 son(s) total     Daughter(s): 2 daughter(s) total;  ASD;  Asthma     Paternal Grandfather: Cause of death was TB/pneumonia     Paternal Grandmother:  at age 50's; Cause of death was pneumonia;  heart issues     Maternal Grandfather:  at age 86;     Maternal Grandmother: Arrhythmia;  Dementia          Social History:     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  Non-drinker     Caffeine:  She admits to consuming caffeine via coffee ( 1 serving per day ).          Substance Abuse History:     Last Reviewed on 8/23/2019 01:17 PM by Hayley Doherty    NEGATIVE         Mental Health History:     Last Reviewed on 8/23/2019 01:17 PM by Hayley Doherty        Communicable Diseases (eg STDs):     Last Reviewed on 8/23/2019 01:17 PM by Hayley Doherty        Current Problems:     Last Reviewed on 8/23/2019 01:17 PM by Hayley Doherty    Benign positional vertigo (mild, intermittent)    Benign paroxysmal vertigo, bilateral    Unspecified symptoms and signs involving cognitive functions and awareness    Memory loss    Screening for depression    Encounter for screening for depression        Immunizations:     PPD 8/23/2019        Allergies:     Last Reviewed on 1/23/2021 10:02 AM by Bella Joshi    No Known Allergies.        Current Medications:     Last Reviewed on 8/23/2019 01:17 PM by Hayley Doherty    None        Objective:        Vitals:         Historical:     8/23/2019  BP:   119/61 mm Hg ( (left arm, , sitting, );) 8/23/2019  P:   75bpm ( (left arm (BP Cuff), , sitting, );) 8/23/2019  Wt:   133.4lbs    Current: 1/23/2021 10:05:23 AM    Ht:  5 ft, 3.5 in;  Wt: 138 lbs;  BMI: 24.1T: 97.5 F (oral);  BP: 141/54 mm Hg (left arm, sitting);  P: 84 bpm (left arm (BP Cuff), sitting);  sCr: 0.86 mg/dL;  GFR: 63.91        Exams:     PHYSICAL EXAM:     GENERAL: well developed, well nourished;  no apparent distress;     RESPIRATORY: normal respiratory rate and pattern with no distress; normal breath sounds with no rales, rhonchi, wheezes or rubs;     CARDIOVASCULAR: normal rate; rhythm is regular;     BREAST/INTEGUMENT: SKIN: no  significant rashes or lesions; no suspicious moles; oral herpetic lesions;     NEUROLOGIC: mental status: alert and oriented x 3; GROSSLY INTACT     PSYCHIATRIC: appropriate affect and demeanor;         Procedures:     Encounter for immunization    1. Patient experienced no reaction.              Assessment:         B00.9   Herpesviral infection, unspecified       Z23   Encounter for immunization       Z13.31   Encounter for screening for depression           ORDERS:         Meds Prescribed:       [New Rx] valACYclovir 1 gram oral tablet [take 1 tablet (1,000 mg) by oral route every 12 hours X 7 days ], #14 (fourteen) tablets, Refills: 0 (zero)         Procedures Ordered:       02562  Immunization administration; one vaccine  (In-House)            06280  Adacel  (In-House)              Other Orders:         Depression screen negative  (In-House)                      Plan:         Herpesviral infection, unspecified    MIPS Vaccines Flu and Pneumonia updated in Shot record     FOLLOW-UP: Chronic visit follow up:for Annual Checkup She has not been seen in clinic since 2019 and no labs on file here since 2017. Advised recommendation to be seen for yearly preventive visit.           Prescriptions:       [New Rx] valACYclovir 1 gram oral tablet [take 1 tablet (1,000 mg) by oral route every 12 hours X 7 days ], #14 (fourteen) tablets, Refills: 0 (zero)         Encounter for immunization          Immunizations:       38348  Immunization administration; one vaccine  (In-House)            90393  Adacel  (In-House)                Dose (ml): 0.5  Site: left deltoid  Route: intramuscular  Administered by: Bella Joshi          : [employer]  Lot #: [id number]  Exp: 07/31/2022          NDC: [address]-24        Encounter for screening for depression    MIPS PHQ-9 Depression Screening: Completed form scanned and in chart; Total Score 1; Negative Depression Screen           Orders:         Depression screen  negative  (In-House)                  Charge Capture:         Primary Diagnosis:     B00.9  Herpesviral infection, unspecified           Orders:      61653  Office/outpatient visit; established patient, level 3  (In-House)              Z23  Encounter for immunization           Orders:      63008  Immunization administration; one vaccine  (In-House)            22243  Adacel  (In-House)              Z13.31  Encounter for screening for depression           Orders:        Depression screen negative  (In-House)

## 2021-05-18 NOTE — PROGRESS NOTES
Johanny Robins 1955     Office/Outpatient Visit    Visit Date:  01:58 pm    Provider: Norma Segundo N.P. (Assistant: Selene Gonzalez MA)    Location: [facility]        Electronically signed by Norma Segundo N.P. on  2019 06:34:46 PM                             SUBJECTIVE:        CC:     Ms. Robins is a 63 year old White female.  presents today due to complaints of right ear pain X 2 days         HPI:         Dx with ear ache;     Ms. Robins complains of right ear pain.  There are no other symptoms; specifically, she has not had diminished hearing, ear drainage and fever.  The symptoms began 2 days ago.  Pertinent history includes recent swimming.  She has already tried to relieve the symptoms with ibuprofen.      ROS:     CONSTITUTIONAL:  Negative for chills and fever.      EYES:  Negative for eye drainage and eye pain.      E/N/T:  Positive for ear pain ( right ).   Negative for diminished hearing, nasal congestion or sore throat.      CARDIOVASCULAR:  Negative for chest pain and palpitations.      RESPIRATORY:  Negative for dyspnea and frequent wheezing.          PM/FM/:     Last Reviewed on 2019 01:04 PM by Robert Radford    Past Medical History:             PAST MEDICAL HISTORY     Hospitalizations: MRSA/facial cellulitis, admitted once on          GYNECOLOGICAL HISTORY:     miscarriage 3         Surgical History:         : X 3;     Procedures:    Colonoscopy ( 2015-NEG;; )         Family History:     Father: Healthy     Mother: Hypothyroidism     Brother(s): 1 brother(s) total;  cerebral palsy     Sister(s): Healthy; 1 sister(s) total     Son(s): Healthy; 1 son(s) total     Daughter(s): 2 daughter(s) total;  ASD;  Asthma     Paternal Grandfather: Cause of death was TB/pneumonia     Paternal Grandmother:  at age 50's; Cause of death was pneumonia;  heart issues     Maternal Grandfather:  at age 86;     Maternal Grandmother:  Arrhythmia;  Dementia         Social History:     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  Non-drinker     Caffeine:  She admits to consuming caffeine via coffee ( 1 serving per day ).          Substance Abuse History:     Last Reviewed on 2/20/2019 01:04 PM by Robert Radford    NEGATIVE         Mental Health History:     Last Reviewed on 2/20/2019 01:04 PM by Robert Radford        Communicable Diseases (eg STDs):     Last Reviewed on 2/20/2019 01:04 PM by Robert Radford            Current Problems:     Last Reviewed on 2/20/2019 01:04 PM by Robert Radford    Memory loss     Benign positional vertigo (mild, intermittent)         Immunizations:     None        Allergies:     Last Reviewed on 2/20/2019 01:04 PM by Robert Radford      No Known Drug Allergies.         Current Medications:     Last Reviewed on 2/20/2019 01:04 PM by Robert Radford    None        OBJECTIVE:        Vitals:         Current: 7/1/2019 2:02:23 PM    Ht:  5 ft, 3.5 in;  Wt: 133.8 lbs;  BMI: 23.3    T: 98.2 F (oral);  BP: 133/65 mm Hg (left arm, sitting);  P: 67 bpm (left arm (BP Cuff), sitting);  sCr: 0.86 mg/dL;  GFR: 64.66        Exams:     PHYSICAL EXAM:     GENERAL: well developed, well nourished;  no apparent distress;     EYES: PERRL, EOMI     E/N/T: EARS: external auditory canal normal on the left and occluded by cerumen on the right;  both TMs are normal after cerumen removal;  NOSE: normal turbinates; no sinus tenderness; OROPHARYNX: oral mucosa is normal; posterior pharynx shows no exudate;     NECK: range of motion is normal; trachea is midline;     RESPIRATORY: normal respiratory rate and pattern with no distress; normal breath sounds with no rales, rhonchi, wheezes or rubs;     CARDIOVASCULAR: normal rate; rhythm is regular;      MUSCULOSKELETAL: normal gait;     NEUROLOGIC: mental status: alert and oriented x 3;     PSYCHIATRIC: appropriate affect and demeanor;         Procedures:     Cerumen impaction         Cerumen/Wax removal: Cerumen impaction is noted in the right ear The degree of wax accumulation is minor in the right ear.  With minimal difficulty, using a syringe irrigation, the wax is removed.  Removed from ear was hard balls of wax.  The patient tolerated the procedure well.      There were no complications.  Performed by: AS             ASSESSMENT           380.4   H61.21  Cerumen impaction              DDx:     V79.0   Z13.31  Screening for depression              DDx:         ORDERS:         Procedures Ordered:       [id number]  Removal of impacted cerumen right ear (NURSE)  (In-House)           Other Orders:         Depression screen negative  (In-House)                   PLAN:          Cerumen impaction         TESTS/PROCEDURES:  Will proceed with Cerumen Removal--by Nurse: Right ear, to be performed/scheduled now.      RECOMMENDATIONS given include: Debrox. No Qtips..      FOLLOW-UP: Chronic visit follow up           Orders:       [id number]  Removal of impacted cerumen right ear (NURSE)  (In-House)            Screening for depression     MIPS PHQ-9 Depression Screening: Completed form scanned and in chart; Total Score 5; Negative Depression Screen           Orders:         Depression screen negative  (In-House)               CHARGE CAPTURE           **Please note: ICD descriptions below are intended for billing purposes only and may not represent clinical diagnoses**        Primary Diagnosis:         380.4 Cerumen impaction            H61.21    Impacted cerumen, right ear              Orders:          61829   Office/outpatient visit; established patient, level 3  (In-House)             06860GI   Removal of impacted cerumen right ear (NURSE)  (In-House)           V79.0 Screening for depression            Z13.31     Encounter for screening for depression              Orders:             Depression screen negative  (In-House)

## 2021-07-01 VITALS
HEIGHT: 64 IN | WEIGHT: 133.2 LBS | TEMPERATURE: 97.8 F | HEART RATE: 71 BPM | DIASTOLIC BLOOD PRESSURE: 62 MMHG | SYSTOLIC BLOOD PRESSURE: 130 MMHG | BODY MASS INDEX: 22.74 KG/M2

## 2021-07-01 VITALS
BODY MASS INDEX: 23.93 KG/M2 | WEIGHT: 140.2 LBS | HEART RATE: 75 BPM | HEIGHT: 64 IN | SYSTOLIC BLOOD PRESSURE: 129 MMHG | TEMPERATURE: 98.3 F | DIASTOLIC BLOOD PRESSURE: 64 MMHG

## 2021-07-01 VITALS
WEIGHT: 133.4 LBS | SYSTOLIC BLOOD PRESSURE: 119 MMHG | HEART RATE: 75 BPM | HEIGHT: 64 IN | DIASTOLIC BLOOD PRESSURE: 61 MMHG | TEMPERATURE: 98.4 F | BODY MASS INDEX: 22.77 KG/M2

## 2021-07-01 VITALS
BODY MASS INDEX: 22.84 KG/M2 | WEIGHT: 133.8 LBS | HEIGHT: 64 IN | DIASTOLIC BLOOD PRESSURE: 65 MMHG | SYSTOLIC BLOOD PRESSURE: 133 MMHG | TEMPERATURE: 98.2 F | HEART RATE: 67 BPM

## 2021-07-02 VITALS
BODY MASS INDEX: 23.56 KG/M2 | TEMPERATURE: 97.5 F | WEIGHT: 138 LBS | SYSTOLIC BLOOD PRESSURE: 141 MMHG | DIASTOLIC BLOOD PRESSURE: 54 MMHG | HEIGHT: 64 IN | HEART RATE: 84 BPM

## 2021-08-03 ENCOUNTER — HOSPITAL ENCOUNTER (OUTPATIENT)
Dept: GENERAL RADIOLOGY | Facility: HOSPITAL | Age: 66
Discharge: HOME | End: 2021-08-03
Admitting: NURSE PRACTITIONER

## 2021-08-03 ENCOUNTER — OFFICE VISIT (OUTPATIENT)
Dept: FAMILY MEDICINE CLINIC | Age: 66
End: 2021-08-03

## 2021-08-03 VITALS
SYSTOLIC BLOOD PRESSURE: 115 MMHG | BODY MASS INDEX: 22.77 KG/M2 | HEIGHT: 64 IN | DIASTOLIC BLOOD PRESSURE: 78 MMHG | WEIGHT: 133.4 LBS | HEART RATE: 82 BPM | TEMPERATURE: 97.8 F

## 2021-08-03 DIAGNOSIS — M79.671 INTRACTABLE RIGHT HEEL PAIN: Primary | ICD-10-CM

## 2021-08-03 PROCEDURE — 73650 X-RAY EXAM OF HEEL: CPT

## 2021-08-03 PROCEDURE — 99213 OFFICE O/P EST LOW 20 MIN: CPT | Performed by: NURSE PRACTITIONER

## 2021-08-03 NOTE — PROGRESS NOTES
"Chief Complaint  Foot Problem (complaints of R heel pain X 2 weeks, denies injury)    Subjective          Johanny Robins presents to [facility] with severe right heel pain x 2-3 weeks. She states she usually walks 20,000 steps daily and took a 4 day break and it began to feel better. She has eased back into walking and pain has intensified and now is radiating down center of foot. She has been to podiatry in the past for plantar fasciitis, but this pain feels different. States that ice does not help. Tylenol at night if pain is severe.           Objective   Vital Signs:   /78 (BP Location: Left arm, Patient Position: Sitting)   Pulse 82   Temp 97.8 °F (36.6 °C) (Oral)   Ht 161.3 cm (63.5\")   Wt 60.5 kg (133 lb 6.4 oz)   BMI 23.26 kg/m²     Physical Exam  Vitals reviewed.   Constitutional:       Appearance: Normal appearance. She is well-developed.   HENT:      Head: Normocephalic and atraumatic.   Eyes:      Conjunctiva/sclera: Conjunctivae normal.      Pupils: Pupils are equal, round, and reactive to light.   Cardiovascular:      Rate and Rhythm: Normal rate and regular rhythm.      Heart sounds: Normal heart sounds. No murmur heard.     Pulmonary:      Effort: Pulmonary effort is normal. No respiratory distress.      Breath sounds: Normal breath sounds.   Musculoskeletal:         General: Tenderness present. Normal range of motion.      Cervical back: Full passive range of motion without pain.      Right lower leg: No edema.      Left lower leg: No edema.      Comments: Tenderness center of plantar surface of right heel.    Skin:     General: Skin is warm and dry.   Neurological:      Mental Status: She is alert and oriented to person, place, and time.   Psychiatric:         Mood and Affect: Mood and affect normal.         Behavior: Behavior normal.         Thought Content: Thought content normal.         Judgment: Judgment normal.          Result Review :            "   Assessment and Plan    Diagnoses and all orders for this visit:    1. Intractable right heel pain (Primary)  -     XR Calcaneus 2+ View Right (In Office)     Will notify pt of results.  If there is a heel spur or another deformity will send to podiatry.  If there is not, will send to physical therapy.  She is to continue using Tylenol as needed.  Patient to notify office with any acute concerns or issues.  Patient verbalizes understanding, agrees with plan of care and has no further questions upon discharge.    Please note that portions of this note were completed with a voice recognition program.    Follow Up    Return if symptoms worsen or fail to improve.  Patient was given instructions and counseling regarding her condition or for health maintenance advice. Please see specific information pulled into the AVS if appropriate.